# Patient Record
Sex: MALE | Race: WHITE | HISPANIC OR LATINO | URBAN - METROPOLITAN AREA
[De-identification: names, ages, dates, MRNs, and addresses within clinical notes are randomized per-mention and may not be internally consistent; named-entity substitution may affect disease eponyms.]

---
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
## 2021-04-06 DIAGNOSIS — Z23 ENCOUNTER FOR IMMUNIZATION: ICD-10-CM

## 2021-04-21 ENCOUNTER — TELEPHONE (OUTPATIENT)
Dept: PEDIATRICS CLINIC | Facility: CLINIC | Age: 21
End: 2021-04-21

## 2021-04-24 ENCOUNTER — IMMUNIZATIONS (OUTPATIENT)
Dept: FAMILY MEDICINE CLINIC | Facility: HOSPITAL | Age: 21
End: 2021-04-24

## 2021-04-24 DIAGNOSIS — Z23 ENCOUNTER FOR IMMUNIZATION: Primary | ICD-10-CM

## 2021-04-24 PROCEDURE — 0001A SARS-COV-2 / COVID-19 MRNA VACCINE (PFIZER-BIONTECH) 30 MCG: CPT

## 2021-04-24 PROCEDURE — 91300 SARS-COV-2 / COVID-19 MRNA VACCINE (PFIZER-BIONTECH) 30 MCG: CPT

## 2021-05-16 ENCOUNTER — IMMUNIZATIONS (OUTPATIENT)
Dept: FAMILY MEDICINE CLINIC | Facility: HOSPITAL | Age: 21
End: 2021-05-16

## 2021-05-16 DIAGNOSIS — Z23 ENCOUNTER FOR IMMUNIZATION: Primary | ICD-10-CM

## 2021-05-16 PROCEDURE — 0002A SARS-COV-2 / COVID-19 MRNA VACCINE (PFIZER-BIONTECH) 30 MCG: CPT

## 2021-05-16 PROCEDURE — 91300 SARS-COV-2 / COVID-19 MRNA VACCINE (PFIZER-BIONTECH) 30 MCG: CPT

## 2021-05-20 NOTE — TELEPHONE ENCOUNTER
05/20/21 3:02 PM     Thank you for your request  Your request has been received, reviewed, and the patient chart updated  The PCP has successfully been removed with a patient attribution note  This message will now be completed      Thank you  Obinna Mckay

## 2021-12-14 ENCOUNTER — IMMUNIZATIONS (OUTPATIENT)
Dept: FAMILY MEDICINE CLINIC | Facility: HOSPITAL | Age: 21
End: 2021-12-14

## 2021-12-14 DIAGNOSIS — Z23 ENCOUNTER FOR IMMUNIZATION: Primary | ICD-10-CM

## 2021-12-14 PROCEDURE — 0001A COVID-19 PFIZER VACC 0.3 ML: CPT

## 2021-12-14 PROCEDURE — 91300 COVID-19 PFIZER VACC 0.3 ML: CPT

## 2023-01-03 ENCOUNTER — OFFICE VISIT (OUTPATIENT)
Dept: INTERNAL MEDICINE CLINIC | Facility: CLINIC | Age: 23
End: 2023-01-03

## 2023-01-03 VITALS
SYSTOLIC BLOOD PRESSURE: 138 MMHG | WEIGHT: 171.8 LBS | BODY MASS INDEX: 30.44 KG/M2 | OXYGEN SATURATION: 96 % | HEIGHT: 63 IN | HEART RATE: 86 BPM | DIASTOLIC BLOOD PRESSURE: 84 MMHG

## 2023-01-03 DIAGNOSIS — Z23 ENCOUNTER FOR IMMUNIZATION: ICD-10-CM

## 2023-01-03 DIAGNOSIS — F41.1 GAD (GENERALIZED ANXIETY DISORDER): ICD-10-CM

## 2023-01-03 DIAGNOSIS — Z13.89 SCREENING FOR BLOOD OR PROTEIN IN URINE: ICD-10-CM

## 2023-01-03 DIAGNOSIS — J45.20 MILD INTERMITTENT ASTHMA WITHOUT COMPLICATION: ICD-10-CM

## 2023-01-03 DIAGNOSIS — Z00.00 HEALTH CARE MAINTENANCE: Primary | ICD-10-CM

## 2023-01-03 LAB
SL AMB  POCT GLUCOSE, UA: NORMAL
SL AMB LEUKOCYTE ESTERASE,UA: NORMAL
SL AMB POCT BILIRUBIN,UA: NORMAL
SL AMB POCT BLOOD,UA: NORMAL
SL AMB POCT CLARITY,UA: CLEAR
SL AMB POCT COLOR,UA: YELLOW
SL AMB POCT KETONES,UA: NORMAL
SL AMB POCT NITRITE,UA: NORMAL
SL AMB POCT PH,UA: 7
SL AMB POCT SPECIFIC GRAVITY,UA: 1.02
SL AMB POCT URINE PROTEIN: NORMAL
SL AMB POCT UROBILINOGEN: NORMAL

## 2023-01-03 RX ORDER — ALBUTEROL SULFATE 90 UG/1
AEROSOL, METERED RESPIRATORY (INHALATION)
Qty: 18 G | Refills: 4 | Status: SHIPPED | OUTPATIENT
Start: 2023-01-03

## 2023-01-03 NOTE — PROGRESS NOTES
FAMILY PRACTICE HEALTH MAINTENANCE OFFICE VISIT  [facility]    NAME: oBb Wong  AGE: 25 y o  SEX: male  : 2000     DATE: 1/3/2023    Assessment and Plan     1  Health care maintenance    2  Encounter for immunization  -     TDAP VACCINE GREATER THAN OR EQUAL TO 8YO IM    3  Mild intermittent asthma without complication  -     albuterol (PROVENTIL HFA,VENTOLIN HFA) 90 mcg/act inhaler; Use 1-2 puffs every 4 6 hours for wheezing as needed    4  Screening for blood or protein in urine  -     POCT urine dip    5  TERRANCE (generalized anxiety disorder)  -     Ambulatory Referral to [facility]; Future        · Patient Counseling:   · Nutrition: Stressed importance of a well balanced diet, moderation of sodium/saturated fat, caloric balance and sufficient intake of fiber  · Exercise: Stressed the importance of regular exercise with a goal of 150 minutes per week  · Dental Health: Discussed daily flossing and brushing and regular dental visits   · Sexuality: Discussed sexually transmitted infections, use of condoms and prevention of unintended pregnancy  · Alcohol Use:  Recommended moderation of alcohol intake  · Injury Prevention: Discussed Safety Belts, Safety Helmets, and Smoke Detectors    · Immunizations reviewed: See Orders and Risks and Benefits discussed  • BMI Counseling: Body mass index is 30 21 kg/m²  Discussed with patient's BMI with him  The BMI is above normal  Nutrition recommendations include reducing portion sizes, moderation in carbohydrate intake and increasing intake of lean protein  Exercise recommendations include exercising 3-5 times per week  Return in about 1 year (around 1/3/2024)          Chief Complaint     Chief Complaint   Patient presents with   • Physical Exam       History of Present Illness     Here for college PE  Reviewed medical history  Due for Tdap        Well Adult Physical   Patient here for a comprehensive physical exam       Diet and Physical Activity  Diet: well balanced diet  Exercise: weekly      Depression Screen  PHQ-2/9 Depression Screening    Little interest or pleasure in doing things: 0 - not at all  Feeling down, depressed, or hopeless: 0 - not at all  PHQ-2 Score: 0  PHQ-2 Interpretation: Negative depression screen          General Health  Hearing: Normal:  bilateral  Vision: no vision problems and goes for regular eye exams  Dental: regular dental visits    Reproductive Health  No issues       The following portions of the patient's history were reviewed and updated as appropriate: allergies, current medications, past family history, past medical history, past social history, past surgical history and problem list     Review of Systems     Review of Systems   Constitutional: Negative  Respiratory: Negative  Cardiovascular: Negative  Gastrointestinal: Negative  Neurological: Negative  Psychiatric/Behavioral: Negative for dysphoric mood, self-injury, sleep disturbance and suicidal ideas  The patient is nervous/anxious  All other systems reviewed and are negative        Past Medical History     Past Medical History:   Diagnosis Date   • Asthma    • Pyloric stenosis        Past Surgical History     Past Surgical History:   Procedure Laterality Date   • PYLOROPLASTY         Social History     Social History     Socioeconomic History   • Marital status: Single     Spouse name: None   • Number of children: None   • Years of education: None   • Highest education level: None   Occupational History   • None   Tobacco Use   • Smoking status: Never   • Smokeless tobacco: Never   Vaping Use   • Vaping Use: Never used   Substance and Sexual Activity   • Alcohol use: Not Currently     Comment: 1 per month   • Drug use: No   • Sexual activity: Never     Partners: Male     Comment: 'interested in men" but has NOT been sexually active yet   Other Topics Concern   • None   Social History Narrative   • None Social Determinants of Health     Financial Resource Strain: Not on file   Food Insecurity: Not on file   Transportation Needs: Not on file   Physical Activity: Not on file   Stress: Not on file   Social Connections: Not on file   Intimate Partner Violence: Not on file   Housing Stability: Not on file       Family History     Family History   Problem Relation Age of Onset   • No Known Problems Mother    • No Known Problems Father        Current Medications       Current Outpatient Medications:   •  albuterol (PROVENTIL HFA,VENTOLIN HFA) 90 mcg/act inhaler, Use 1-2 puffs every 4 6 hours for wheezing as needed, Disp: 18 g, Rfl: 4     Allergies     Allergies   Allergen Reactions   • Cat Hair Extract Itching and Sneezing   • Dog Epithelium Itching and Sneezing       Objective     /84   Pulse 86   Ht 5' 3 23" (1 606 m)   Wt 77 9 kg (171 lb 12 8 oz)   SpO2 96%   BMI 30 21 kg/m²      Physical Exam  Vitals and nursing note reviewed  Constitutional:       General: He is not in acute distress  Appearance: Normal appearance  He is well-developed  HENT:      Head: Normocephalic and atraumatic  Eyes:      Conjunctiva/sclera: Conjunctivae normal       Pupils: Pupils are equal, round, and reactive to light  Neck:      Thyroid: No thyromegaly  Cardiovascular:      Rate and Rhythm: Normal rate and regular rhythm  Pulses: Normal pulses  Heart sounds: Normal heart sounds  No murmur heard  Pulmonary:      Effort: Pulmonary effort is normal       Breath sounds: Normal breath sounds  Abdominal:      General: Bowel sounds are normal  There is no abdominal bruit  Palpations: Abdomen is soft  Tenderness: There is no abdominal tenderness  Musculoskeletal:         General: No deformity  Cervical back: Normal range of motion and neck supple  Lymphadenopathy:      Cervical: No cervical adenopathy  Skin:     General: Skin is warm and dry  Coloration: Skin is not pale  Neurological:      General: No focal deficit present  Mental Status: He is alert  Motor: No weakness        Coordination: Coordination normal    Psychiatric:         Mood and Affect: Mood normal          Behavior: Behavior normal            Vision Screening    Right eye Left eye Both eyes   Without correction      With correction 20/20 20/20 20/20           BERNARD Ireland  [facility]

## 2023-01-31 ENCOUNTER — SOCIAL WORK (OUTPATIENT)
Dept: BEHAVIORAL/MENTAL HEALTH CLINIC | Facility: CLINIC | Age: 23
End: 2023-01-31

## 2023-01-31 DIAGNOSIS — F41.9 ANXIETY: Primary | ICD-10-CM

## 2023-01-31 NOTE — PSYCH
Behavioral Health Psychotherapy Progress Note    Psychotherapy Provided: Individual Psychotherapy     1  Anxiety            Goals addressed in session: Goal 1     DATA: Saturnino Hampton struggling with anxiety  Has long hx of anxiety and worry  Questions himself constantly  Deals with stress and anxiety related to educational and vocational activities and his future  Lives with parents who are supportive  Has limited social supports outside of family and tends to be an introvert  Has started to read as a way to reduce stress and anxiety  Also, goes to gym regularly  Denies depressive symptoms  During this session, this clinician used the following therapeutic modalities: Cognitive Behavioral Therapy and Supportive Psychotherapy    Substance Abuse was not addressed during this session  If the client is diagnosed with a co-occurring substance use disorder, please indicate any changes in the frequency or amount of use: none  Stage of change for addressing substance use diagnoses: No substance use/Not applicable    ASSESSMENT:  Thereasa Schilder presents with a Euthymic/ normal and Anxious mood  his affect is Normal range and intensity, which is congruent, with his mood and the content of the session  The client has made progress on their goals  Thereasa Schilder presents with a minimal risk of suicide, minimal risk of self-harm, and minimal risk of harm to others  For any risk assessment that surpasses a "low" rating, a safety plan must be developed  A safety plan was indicated: no  If yes, describe in detail N/A    PLAN: Between sessions, Thereasa Schilder will utilize coping strategies reviewed in session to manage anxiety  Given a handout on relaxation strategies including progressive muscle relaxation, relaxation breathing and guided imagery for his review    At the next session, the therapist will use Cognitive Behavioral Therapy and Supportive Psychotherapy to address anxiety and worry issues      Behavioral Health Treatment Plan and Discharge Planning: Basilia Rodriguez is aware of and agrees to continue to work on their treatment plan  They have identified and are working toward their discharge goals   yes    Visit start and stop times: 5:00-6:10    01/31/23

## 2023-02-21 ENCOUNTER — SOCIAL WORK (OUTPATIENT)
Dept: BEHAVIORAL/MENTAL HEALTH CLINIC | Facility: CLINIC | Age: 23
End: 2023-02-21

## 2023-02-21 DIAGNOSIS — F41.9 ANXIETY: Primary | ICD-10-CM

## 2023-03-21 ENCOUNTER — SOCIAL WORK (OUTPATIENT)
Dept: BEHAVIORAL/MENTAL HEALTH CLINIC | Facility: CLINIC | Age: 23
End: 2023-03-21

## 2023-03-21 DIAGNOSIS — F41.9 ANXIETY: Primary | ICD-10-CM

## 2023-03-21 NOTE — PSYCH
Behavioral Health Psychotherapy Progress Note    Psychotherapy Provided: Individual Psychotherapy     1  Anxiety            Goals addressed in session: Goal 1 Manage anxiety    DATA: Hilda Pacheco denies any acute stress or anxiety issues  Has been able to manage his stress related to academics  Bigger source of stress is his older brother and his lack of any productivity despite expectations parents place on Hilda Pacheco  Difficult to reconcile at times  Has not affected his academic performance  Continues to exercise daily and read recreationally to offset stress and this has been beneficial   During this session, this clinician used the following therapeutic modalities: Solution-Focused Therapy and Supportive Psychotherapy    Substance Abuse was addressed during this session  If the client is diagnosed with a co-occurring substance use disorder, please indicate any changes in the frequency or amount of use: none  Stage of change for addressing substance use diagnoses: No substance use/Not applicable    ASSESSMENT:  Marshall Stewart presents with a Anxious mood  his affect is Normal range and intensity, which is congruent, with his mood and the content of the session  The client has made progress on their goals  Marshall Stewart presents with a minimal risk of suicide, minimal risk of self-harm, and minimal risk of harm to others  For any risk assessment that surpasses a "low" rating, a safety plan must be developed  A safety plan was indicated: no  If yes, describe in detail N/A    PLAN: Between sessions, Marshall Stewart will continue to utilize appropriate stress mgmt and productive outlets to offset any stress  Processed family issues- support provided  Reviewed appropriate boundaries and expectations to maintain with family to limit stress    At the next session, the therapist will use Solution-Focused Therapy and Supportive Psychotherapy to address anxiety      Behavioral Health Treatment Plan and Discharge Planning: Tiago Louise is aware of and agrees to continue to work on their treatment plan  They have identified and are working toward their discharge goals   yes    Visit start and stop times: 3:00-4:00    03/21/23

## 2024-07-03 ENCOUNTER — OFFICE VISIT (OUTPATIENT)
Dept: INTERNAL MEDICINE CLINIC | Facility: CLINIC | Age: 24
End: 2024-07-03
Payer: COMMERCIAL

## 2024-07-03 VITALS
BODY MASS INDEX: 32.04 KG/M2 | SYSTOLIC BLOOD PRESSURE: 122 MMHG | HEART RATE: 61 BPM | WEIGHT: 182.2 LBS | DIASTOLIC BLOOD PRESSURE: 84 MMHG | OXYGEN SATURATION: 99 %

## 2024-07-03 DIAGNOSIS — J45.30 MILD PERSISTENT ASTHMA WITHOUT COMPLICATION: ICD-10-CM

## 2024-07-03 DIAGNOSIS — Z13.6 SCREENING FOR CARDIOVASCULAR CONDITION: ICD-10-CM

## 2024-07-03 DIAGNOSIS — L20.9 ATOPIC DERMATITIS, UNSPECIFIED TYPE: ICD-10-CM

## 2024-07-03 DIAGNOSIS — Z00.00 ANNUAL PHYSICAL EXAM: Primary | ICD-10-CM

## 2024-07-03 PROBLEM — J45.20 MILD INTERMITTENT ASTHMA WITHOUT COMPLICATION: Status: ACTIVE | Noted: 2018-06-28

## 2024-07-03 PROCEDURE — 99213 OFFICE O/P EST LOW 20 MIN: CPT | Performed by: INTERNAL MEDICINE

## 2024-07-03 PROCEDURE — 99395 PREV VISIT EST AGE 18-39: CPT | Performed by: INTERNAL MEDICINE

## 2024-07-03 RX ORDER — FLUTICASONE FUROATE 100 UG/1
1 POWDER RESPIRATORY (INHALATION) DAILY
Qty: 30 BLISTER | Refills: 3 | Status: SHIPPED | OUTPATIENT
Start: 2024-07-03

## 2024-07-03 RX ORDER — ALBUTEROL SULFATE 90 UG/1
AEROSOL, METERED RESPIRATORY (INHALATION)
Qty: 18 G | Refills: 1 | Status: SHIPPED | OUTPATIENT
Start: 2024-07-03

## 2024-07-03 NOTE — ASSESSMENT & PLAN NOTE
-Recommended patient apply over-the-counter topical hydrocortisone cream to the affected areas twice a day over the next 7 to 10 days

## 2024-07-03 NOTE — PROGRESS NOTES
Adult Annual Physical  Name: Devendra Schuler      : 2000      MRN: [id number]  Encounter Provider: Ronaldo Alvarez MD  Encounter Date: 7/3/2024   [facility]: [facility]    Assessment & Plan   1. Annual physical exam  -     CBC and differential; Future  -     Comprehensive metabolic panel; Future  -     Lipid Panel with Direct LDL reflex; Future  2. Mild persistent asthma without complication  Assessment & Plan:  -Poorly controlled  -Start Arnuity Ellipta inhaler  -Patient to follow-up in 3 months to assess response  Orders:  -     albuterol (PROVENTIL HFA,VENTOLIN HFA) 90 mcg/act inhaler; Use 1-2 puffs every 4 6 hours for wheezing as needed  -     fluticasone (Arnuity Ellipta) 100 MCG/ACT AEPB inhaler; Inhale 1 puff daily Rinse mouth after use.  3. Atopic dermatitis, unspecified type  Assessment & Plan:  -Recommended patient apply over-the-counter topical hydrocortisone cream to the affected areas twice a day over the next 7 to 10 days  4. Screening for cardiovascular condition  -     Lipid Panel with Direct LDL reflex; Future    Immunizations and preventive care screenings were discussed with patient today. Appropriate education was printed on patient's after visit summary.         History of Present Illness     Adult Annual Physical  Patient presents today for an annual physical and to establish care.  His past medical history is notable for asthma.  He reports his asthma control has been worse over the past year.  He states his symptoms are aggravated by exercise and that he has been requiring the use of his inhaler at least once a day.    He also reports occasional rash that develops on his hand and more recently on his left upper eyelid.  He describes the rash as pruritic.  He states currently it is fairly mild.      Review of Systems  All other systems negative except for pertinent findings noted in HPI.     Past Medical History   Past Medical History:   Diagnosis  Date   • Asthma    • Pyloric stenosis      Past Surgical History:   Procedure Laterality Date   • PYLOROPLASTY       Family History   Problem Relation Age of Onset   • No Known Problems Mother    • No Known Problems Father    • Diabetes Paternal Grandmother    • Diabetes Paternal Grandfather    • Stroke Paternal Grandfather    • Diabetes Maternal Aunt    • Breast cancer Maternal Aunt      Current Outpatient Medications on File Prior to Visit   Medication Sig Dispense Refill   • [DISCONTINUED] albuterol (PROVENTIL HFA,VENTOLIN HFA) 90 mcg/act inhaler Use 1-2 puffs every 4 6 hours for wheezing as needed 18 g 4     No current facility-administered medications on file prior to visit.     Allergies   Allergen Reactions   • Amoxicillin Other (See Comments)     Unsure of reaction    • Cat Hair Extract Itching and Sneezing   • Dog Epithelium Itching and Sneezing        Objective     /84 (BP Location: Left arm, Patient Position: Sitting, Cuff Size: Standard)   Pulse 61   Wt 82.6 kg (182 lb 3.2 oz)   SpO2 99%   BMI 32.04 kg/m²     Physical Exam  General: NAD  HEENT: NCAT, EOMI, normal conjunctiva, erythematous patch over left upper eyelid mildly erythematous patch on dorsal aspect of left hand  Cardiovascular: RRR, normal S1 and S2, no m/r/g  Pulmonary: Normal respiratory effort, no wheezes, rales or rhonchi  GI: Soft, nontender, nondistended, normoactive bowel sounds  Musculoskeletal: Normal bulk and tone  Neuro: Non-focal, ambulating without difficulty, non-antalgic gait  Extremities: No lower extremity edema  Skin: Normal skin color, no rashes   Psychiatric: Normal mood and affect    Administrative Statements

## 2024-07-03 NOTE — ASSESSMENT & PLAN NOTE
-Poorly controlled  -Start Arnuity Ellipta inhaler  -Patient to follow-up in 3 months to assess response

## 2024-07-03 NOTE — PATIENT INSTRUCTIONS
-The rash on your left upper eyelid and hands is likely due to eczema.  You may apply over-the-counter hydrocortisone cream 1% or 2.5% twice a day for 7 to 10 days.  -For better control of your asthma you will be started on Arnuity Ellipta inhaler.  -A refill for albuterol has been sent to your pharmacy.